# Patient Record
Sex: MALE | Race: WHITE | Employment: OTHER | URBAN - METROPOLITAN AREA
[De-identification: names, ages, dates, MRNs, and addresses within clinical notes are randomized per-mention and may not be internally consistent; named-entity substitution may affect disease eponyms.]

---

## 2019-03-25 ENCOUNTER — TELEPHONE (OUTPATIENT)
Dept: FAMILY MEDICINE | Facility: CLINIC | Age: 44
End: 2019-03-25

## 2019-03-25 NOTE — TELEPHONE ENCOUNTER
Outgoing call placed to patient as it has been a few years since patient has been seen at the [facility].  Patient is feeling fine and just hasn't needed appt.        Stefani Gifford ~ Patient Representative  [address] [address]  [email or web address]  [email or web address]  Office:  [phone]  Fax:  [phone]

## 2019-11-05 ENCOUNTER — OFFICE VISIT (OUTPATIENT)
Dept: FAMILY MEDICINE | Facility: CLINIC | Age: 44
End: 2019-11-05
Payer: COMMERCIAL

## 2019-11-05 VITALS
TEMPERATURE: 99.4 F | HEART RATE: 80 BPM | HEIGHT: 73 IN | BODY MASS INDEX: 31.12 KG/M2 | SYSTOLIC BLOOD PRESSURE: 128 MMHG | RESPIRATION RATE: 12 BRPM | OXYGEN SATURATION: 98 % | WEIGHT: 234.8 LBS | DIASTOLIC BLOOD PRESSURE: 78 MMHG

## 2019-11-05 DIAGNOSIS — M25.561 ACUTE PAIN OF RIGHT KNEE: Primary | ICD-10-CM

## 2019-11-05 PROCEDURE — 99203 OFFICE O/P NEW LOW 30 MIN: CPT | Performed by: PHYSICIAN ASSISTANT

## 2019-11-05 RX ORDER — PREDNISONE 20 MG/1
TABLET ORAL
Qty: 20 TABLET | Refills: 0 | Status: SHIPPED | OUTPATIENT
Start: 2019-11-05 | End: 2019-12-03

## 2019-11-05 ASSESSMENT — MIFFLIN-ST. JEOR: SCORE: 2000.51

## 2019-11-05 ASSESSMENT — PAIN SCALES - GENERAL: PAINLEVEL: SEVERE PAIN (7)

## 2019-11-05 NOTE — NURSING NOTE
Health Maintenance Due   Topic Date Due     PREVENTIVE CARE VISIT  1973     HIV SCREENING  09/03/1988     DTAP/TDAP/TD IMMUNIZATION (1 - Tdap) 09/03/1998     LIPID  09/03/2008     PHQ-2  01/01/2019     INFLUENZA VACCINE (1) 09/01/2019     Donna ROMO LPN

## 2019-11-05 NOTE — PROGRESS NOTES
Subjective     Charles Cox is a 44 year old male who presents to clinic today for the following health issues:    HPI   Joint Pain    Onset: 2 weeks    Description:   Location: right knee  Character: Sharp and Dull ache    Intensity: moderate    Progression of Symptoms: better    Accompanying Signs & Symptoms:  Other symptoms: tingling in right foot and swelling    History:   Previous similar pain: no       Precipitating factors:   Trauma or overuse: no     Alleviating factors:  Improved by: nothing    Therapies Tried and outcome: ice, ibuprofen; slight relief    Patient is a 44 year old male who presents with right knee pain for past 4-6 weeks. He said that he first started feeling twinges of pain in the right knee 6 weeks ago after kicking a football for his dog to retrieve. He said that these seemed to improve over the next week or so. Then he traveled to [place] for a fishing trip and recalls that the knee was painful to walk on and swelled. He provided the example of having difficulty placing weight on the right leg while trying to pull the anchor up on his boat. He did eat quite a bit of shrimp while in [place] as well as consumes beer on a regular basis, but not in excess. Since his return from his trip he has begun using a skid  to build a driveway for his new home. He notes that the machine is very jarring and vibrates frequently. The knee does hurt worse following use. He has been favoring the knee for some time and is in today as the pain will increase to 8/10 with specific movements such as full flexion of the knee. Denies snap/pop/catching with movement, weakness or numbness, redness or fever.     Reviewed and updated as needed this visit by Provider         Review of Systems   ROS COMP: Constitutional, HEENT, cardiovascular, pulmonary, gi and gu systems are negative, except as otherwise noted.      Objective    /78 (BP Location: Left arm, Patient Position: Chair, Cuff Size: Adult Large)  "  Pulse 80   Temp 99.4  F (37.4  C) (Oral)   Resp 12   Ht 1.857 m (6' 1.1\")   Wt 106.5 kg (234 lb 12.8 oz)   SpO2 98%   BMI 30.89 kg/m    Body mass index is 30.89 kg/m .  Physical Exam   GENERAL: healthy, alert and no distress  RESP: lungs clear to auscultation - no rales, rhonchi or wheezes  CV: regular rate and rhythm, normal S1 S2, no S3 or S4, no murmur, click or rub, no peripheral edema and peripheral pulses strong  MS: Exam was difficult as the patient struggled to allow passive ROM. Upon initial observation the right knee is mildly swollen compared to left. Tenderness to palpation along the lateral and medial joint line, minimal tenderness to palpation over the patella, negative poli and ant drawer no pain or laxity with varus/valgus, skin temp to touch, no redness  SKIN: no suspicious lesions or rashes  NEURO: Normal strength and tone, mentation intact and speech normal  PSYCH: mentation appears normal, affect normal/bright    Diagnostic Test Results:  Labs reviewed in Epic        Assessment & Plan       ICD-10-CM    1. Acute pain of right knee M25.561 predniSONE (DELTASONE) 20 MG tablet        Discussed with patient suspicion of prepatellar bursitis given recent increased use with football kicking, travel and skid  use. Differential includes gout given the shrimp and alcohol consumption over his trip to [place]. We discussed treatment options including draining fluid and injection of steroid, patient was reluctant to have this done. Agreed to start patient on oral prednisone and instructed him on use of ice/rest. If not improving as expected or if swelling worsens patient will call.     Return in about 6 months (around 5/5/2020) for Return for scheduled annual checkup with PCP.    Bruce Arcos PA-C  [facility]      "

## 2019-11-06 NOTE — PATIENT INSTRUCTIONS
Patient Education     Understanding Prepatellar Bursitis    A bursa is a thin, slippery, sac-like film. It contains a small amount of fluid. This structure is found between bones and soft tissues in and around joints. A bursa cushions and protects a joint. It keeps parts of a joint from rubbing against each other.  The prepatellar bursa is found on top of the kneecap (patella). It lies just under the skin. If this bursa becomes irritated and inflamed, the condition is called prepatellar bursitis.  Causes of prepatellar bursitis  A common cause of this problem is repeated kneeling on the floor. For this reason, it is sometimes called  s knee. Injury from a blow to your kneecap can also cause it. Running on uneven ground may also play a role.  Symptoms of prepatellar bursitis  These may include:    Knee pain that gets worse with bending or pressure to the knee, and gets better with rest    Swelling over the kneecap    Tenderness or warmth over the kneecap    Crackling sound from the kneecap with movement  Treatment for prepatellar bursitis  This problem often gets better with rest and medicines. Other treatments may be needed. Possible treatments include:    Resting your knee. This allows the area to heal. It includes avoiding things that trigger symptoms.    Prescription or over-the-counter pain medicines. These help reduce pain and swelling.    Stretching and strengthening exercises. These help improve the strength and flexibility of the muscles around the knee.    Cold packs or heat packs. These help reduce pain and swelling.    Physical therapy. This may include exercises, ultrasound, or other treatments.    Kneepads. These help protect your knees during sports.    Injection of medicine into the bursa or drainage of fluid from the bursa. These may help relieve symptoms.  For symptoms that don t get better with these treatments, you may need surgery to remove the bursa.  Possible complications    If  germs get into the bursa through a cut in your skin, the bursa may become infected. An infection is generally treated with antibiotic medicine. In some cases, the infected bursa must be removed.    If your knee isn t given time to heal, this problem may become long-term (chronic). This can lead to trouble moving the knee joint.     When to call your healthcare provider  Call your healthcare provider right away if you have:    Fever of 100.4 F (38 C) or higher, or as directed    Increased swelling or warmth of the area, or drainage from the area    Symptoms that don t get better or get worse    New symptoms   Date Last Reviewed: 3/10/2016    8361-8169 [employer]. [address] [place] [address], [place] [id number]. All rights reserved. This information is not intended as a substitute for professional medical care. Always follow your healthcare professional's instructions.           Patient Education     Gout    Gout is an inflammation of a joint due to a build-up of gout crystals in the joint fluid. This occurs when there is an excess of uric acid (a normal waste product) in the body. Uric acid builds up in the body when the kidneys are unable to filter enough of it from the blood. This may occur with age. It is also associated with kidney disease. Gout occurs more often in people with obesity, diabetes, high blood pressure, or high levels of fats in the blood. It may run in families. Gout tends to come and go. A flare up of gout is called an attack. Drinking alcohol or eating certain foods (such as shellfish or foods with additives such as high-fructose corn syrup) may increase uric acid levels in the blood and cause a gout attack.  During a gout attack, the affected joint may become a hot, red, swollen and painful. If you have had one attack of gout, you are likely to have another. An attack of gout can be treated with medicine. If these attacks become frequent, a daily medicine may be prescribed to help the  kidneys remove uric acid from the body.  Home care  During a gout attack:    Rest painful joints. If gout affects the joints of your foot or leg, you may want to use crutches for the first few days to keep from bearing weight on the affected joint.    When sitting or lying down, raise the painful joint to a level higher than your heart.    Apply an ice pack (ice cubes in a plastic bag wrapped in a thin towel) over the injured area for 20 minutes every 1 to 2 hours the first day for pain relief. Continue this 3 to 4 times a day for swelling and pain.    Avoid alcohol and foods listed below (see Preventing attacks) during a gout attack. Drink extra fluid to help flush the uric acid through your kidneys.    If you were prescribed a medicine to treat gout, take it as your healthcare provider has instructed. Don't skip doses.    Take anti-inflammatory medicine as directed.     If pain medicines have been prescribed, take them exactly as directed.    Preventing attacks    Minimize or avoid alcohol use. Excess alcohol intake can cause a gout attack.    Limit these foods and beverages:  ? Organ meats, such as kidneys and liver  ? Certain seafoods (anchovies, sardines, shrimp, scallops, herring, mackerel)  ? Wild game, meat extracts and meat gravies  ? Foods and beverages sweetened with high-fructose corn syrup, such as sodas    Eat a healthy diet including low-fat and nonfat dairy, whole grains, and vegetables.    If you are overweight, talk to your healthcare provider about a weight reduction plan. Avoid fasting or extreme low calorie diets (less than 900 calories per day). This will increase uric acid levels in the body.    If you have diabetes or high blood pressure, work with your doctor to manage these conditions.    Protect the joint from injury. Trauma can trigger a gout attack.  Follow-up care  Follow up with your healthcare provider, or as advised.   When to seek medical advice  Call your healthcare provider if you  have any of the following:    Fever over 100.4 F (38. C) with worsening joint pain    Increasing redness around the joint    Pain developing in another joint    Repeated vomiting, abdominal pain, or blood in the vomit or stool (black or red color)  Date Last Reviewed: 3/1/2017    8302-1746 [employer]. [address] [place] [address]. All rights reserved. This information is not intended as a substitute for professional medical care. Always follow your healthcare professional's instructions.

## 2019-12-03 ENCOUNTER — OFFICE VISIT (OUTPATIENT)
Dept: FAMILY MEDICINE | Facility: CLINIC | Age: 44
End: 2019-12-03
Payer: COMMERCIAL

## 2019-12-03 VITALS
OXYGEN SATURATION: 97 % | BODY MASS INDEX: 31.04 KG/M2 | TEMPERATURE: 98.9 F | HEART RATE: 80 BPM | RESPIRATION RATE: 12 BRPM | DIASTOLIC BLOOD PRESSURE: 76 MMHG | WEIGHT: 235.9 LBS | SYSTOLIC BLOOD PRESSURE: 124 MMHG

## 2019-12-03 DIAGNOSIS — M25.561 RIGHT MEDIAL KNEE PAIN: Primary | ICD-10-CM

## 2019-12-03 PROCEDURE — 99213 OFFICE O/P EST LOW 20 MIN: CPT | Performed by: PHYSICIAN ASSISTANT

## 2019-12-03 ASSESSMENT — PAIN SCALES - GENERAL: PAINLEVEL: EXTREME PAIN (9)

## 2019-12-04 NOTE — PATIENT INSTRUCTIONS
Patient Education     Cortisone Injections    Cortisone is a type of steroid. It can greatly reduce swelling, redness, inflammation, and pain. Being injected with cortisone is simple and doesn t take long. Your doctor may ask you questions about your health. Cortisone can affect certain health conditions, such as diabetes.  Why have a cortisone injection?  Injecting cortisone can sometimes relieve pain for anything from a sports injury to arthritis. Your doctor may suggest an injection if rest, splints, physical therapy, rehabilitation exercises, or oral medicine doesn t relieve your pain. Injecting cortisone is simpler than having surgery. And cortisone may provide the lasting pain relief that can help you get out and enjoy life again. A recent study showed steroid injections may actually worsen osteoarthritis of the knee. Be sure to discuss all options with your healthcare provider. Injections are usually used no more than 3 to 4 times a year in one area of the body.  Getting the injection  Your doctor will start by cleaning and possibly numbing your skin at the injection site. Next you ll be injected with local anesthetics (for short-term pain relief) and cortisone. The injection may last a few moments. A small bandage will be put over the injection site. You ll then be ready to go home.  After your injection  After being injected, make sure you don t injure the treated region. But stay active. Enjoy a walk or some other mild activity. Just be careful not to strain the region that gave you trouble.  The next day  Some people feel more pain after being injected. This is normal, and it will go away soon. Applying ice for 20 minutes at a time to your injury may reduce the increased pain. Rest for the first day or two. You don t need to stay in bed. But avoid tasks that may strain the injured region.  If you have diabetes  Cortisone injections can increase your blood sugar for several days after the injection. If  you have diabetes, follow your blood sugar closely during this time. Follow your regular plan for what to do when your blood sugar is elevated.   Date Last Reviewed: 9/1/2017 2000-2018 [employer]. [address] [place] [address], [place] [address]. All rights reserved. This information is not intended as a substitute for professional medical care. Always follow your healthcare professional's instructions.

## 2019-12-04 NOTE — PROGRESS NOTES
Subjective     Charles Cox is a 44 year old male who presents to clinic today for the following health issues:    HPI   Joint Pain    Onset: 1 week    Description:   Location: right knee  Character: Stabbing    Intensity: mild, severe    Progression of Symptoms: worse    Accompanying Signs & Symptoms:  Other symptoms: radiation of pain to lower leg, weakness of right knee and swelling    History:   Previous similar pain: YES      Precipitating factors:   Trauma or overuse: no     Alleviating factors:  Improved by: prednisone    Therapies Tried and outcome: prednisone, good relief    Patient is a 44 year old male who returns for recurrent pain in the right knee. He was initially seen for right knee pain on 11/05 following a trip to [place] at which time he reported drinking alcohol and eating shellfish. Treated with anti-inflammatory medication and instructed on RICE therapy.     Today he says that the swelling and pain from the previous visit did resolve with the treatment provided at that time, but over the past week and a half gradually returned. He is in today as his right knee is swollen and painful with activity. He has been working on projects and does admit to some kneeling, but denies alcohol use. However, with thanksgiving having recently occurred and turkey being a food that can lead to gout flares I am slightly suspicious of a gout flare.         Reviewed and updated as needed this visit by Provider         Review of Systems   ROS COMP: Constitutional, HEENT, cardiovascular, pulmonary, gi and gu systems are negative, except as otherwise noted.      Objective    /76 (BP Location: Left arm, Patient Position: Chair, Cuff Size: Adult Large)   Pulse 80   Temp 98.9  F (37.2  C) (Oral)   Resp 12   Wt 107 kg (235 lb 14.4 oz)   SpO2 97%   BMI 31.04 kg/m    Body mass index is 31.04 kg/m .  Physical Exam   GENERAL: healthy, alert and no distress  RESP: lungs clear to auscultation - no rales, rhonchi  or wheezes  CV: regular rate and rhythm, normal S1 S2, no S3 or S4, no murmur, click or rub, no peripheral edema and peripheral pulses strong  MS: no gross musculoskeletal defects noted, mild edema over right knee, negative bulge, tenderness to palpation along right medial knee joint and possible pain over right medial knee with poli though patient unable to relax to allow PROM for exam, neg pain with collateral stress or ant drawer  NEURO: Normal strength and tone, mentation intact and speech normal  PSYCH: mentation appears normal, affect normal/bright    Diagnostic Test Results:  Labs reviewed in Epic        Assessment & Plan       ICD-10-CM    1. Right medial knee pain M25.561 triamcinolone (KENALOG-40) injection 40 mg      Discussed with patient possible mensical injury that improved with anti-inflammatory treatment and subsequently worsened following resumption of full activity. Differential also includes prepatellar bursitis and gout flare. Patient reluctant to have MRI at this time. Opted to treat with cortisone injection and continued RICE therapy. However, if not improving as expected or swelling/pain persists then MRI or PT would be recommended.     Return in about 6 months (around 6/3/2020) for Return for scheduled annual checkup with PCP.    Bruce Arcos PA-C  [facility]

## 2019-12-04 NOTE — NURSING NOTE
Health Maintenance Due   Topic Date Due     PREVENTIVE CARE VISIT  1973     DTAP/TDAP/TD IMMUNIZATION (1 - Tdap) 09/03/1984     HIV SCREENING  09/03/1988     LIPID  09/03/2008     INFLUENZA VACCINE (1) 09/01/2019     Donna ROMO LPN

## 2019-12-05 RX ORDER — TRIAMCINOLONE ACETONIDE 40 MG/ML
40 INJECTION, SUSPENSION INTRA-ARTICULAR; INTRAMUSCULAR ONCE
Status: ACTIVE | OUTPATIENT
Start: 2019-12-05